# Patient Record
Sex: FEMALE | Race: WHITE | NOT HISPANIC OR LATINO | Employment: OTHER | URBAN - NONMETROPOLITAN AREA
[De-identification: names, ages, dates, MRNs, and addresses within clinical notes are randomized per-mention and may not be internally consistent; named-entity substitution may affect disease eponyms.]

---

## 2022-12-01 ENCOUNTER — OFFICE VISIT (OUTPATIENT)
Dept: URGENT CARE | Facility: CLINIC | Age: 77
End: 2022-12-01

## 2022-12-01 VITALS
HEIGHT: 66 IN | SYSTOLIC BLOOD PRESSURE: 173 MMHG | WEIGHT: 182 LBS | TEMPERATURE: 98.6 F | OXYGEN SATURATION: 98 % | RESPIRATION RATE: 16 BRPM | HEART RATE: 73 BPM | DIASTOLIC BLOOD PRESSURE: 97 MMHG | BODY MASS INDEX: 29.25 KG/M2

## 2022-12-01 DIAGNOSIS — R68.89 FLU-LIKE SYMPTOMS: Primary | ICD-10-CM

## 2022-12-01 RX ORDER — FAMOTIDINE 40 MG/1
40 TABLET, FILM COATED ORAL DAILY PRN
COMMUNITY
Start: 2022-08-30

## 2022-12-01 RX ORDER — ESOMEPRAZOLE MAGNESIUM 40 MG/1
CAPSULE, DELAYED RELEASE ORAL
COMMUNITY
Start: 2022-11-09

## 2022-12-01 RX ORDER — DIGOXIN 125 MCG
TABLET ORAL
COMMUNITY
Start: 2022-11-10

## 2022-12-01 RX ORDER — EZETIMIBE 10 MG/1
TABLET ORAL
COMMUNITY
Start: 2022-10-25

## 2022-12-01 RX ORDER — LEVOTHYROXINE SODIUM 0.12 MG/1
125 TABLET ORAL DAILY
COMMUNITY

## 2022-12-01 RX ORDER — SOTALOL HYDROCHLORIDE 80 MG/1
TABLET ORAL
COMMUNITY
Start: 2022-11-10

## 2022-12-01 NOTE — PROGRESS NOTES
[facility]        NAME: Tino Parra is a 78 y o  female  : 1945    MRN: [id number]  DATE: 2022  TIME: 8:52 AM    Assessment and Plan   Flu-like symptoms [R68 89]  1  Flu-like symptoms  Cov/Flu-Collected at [facility]        COVID/Flu PCR pending  Follow CDC guidelines for isolation/quarantine until results back and then as appropriate based on final result  Encouraged continued supportive measures, including OTC Tylenol, Mucinex, or Coricidin HBP  Increase fluid intake and rest   Follow up with PCP in 3-5 days or present to emergency department for worsening symptoms  Patient verbalized understanding of instructions given  Patient Instructions     Patient Instructions     COVID/Flu PCR pending  Follow CDC guidelines for isolation/quarantine until results back and then as appropriate  Continue with supportive measures, OTC Tylenol, nasal decongestants/cough suppressants (Mucinex)  Cool mist humidifiers, throat lozenges, increased fluid intake and rest   Follow up with PCP in 3-5 days  Present to ER if symptoms worsen  Viral Syndrome   AMBULATORY CARE:   Viral syndrome  is a term used for symptoms of an infection caused by a virus  Viruses are spread easily from person to person through the air and on shared items  Signs and symptoms  may start slowly or suddenly and last hours to days  They can be mild to severe and can change over days or hours  You may have any of the following:  · Fever and chills    · A runny or stuffy nose    · Cough, sore throat, or hoarseness    · Headache, or pain and pressure around your eyes    · Muscle aches and joint pain    · Shortness of breath or wheezing    · Abdominal pain, cramps, and diarrhea    · Nausea, vomiting, or loss of appetite    Call your local emergency number (911 in the [address] [place] [address] Rd,[address]) or have someone else call if:   · You have a seizure  · You cannot be woken  · You have chest pain or trouble breathing      Seek care immediately if:   · You have a stiff neck, a bad headache, and sensitivity to light  · You feel weak, dizzy, or confused  · You stop urinating or urinate a lot less than usual     · You cough up blood or thick yellow or green mucus  · You have severe abdominal pain or your abdomen is larger than usual     Call your doctor if:   · Your symptoms do not get better with treatment or get worse after 3 days  · You have a rash or ear pain  · You have burning when you urinate  · You have questions or concerns about your condition or care  Treatment for viral syndrome  may include medicines to manage your symptoms  Antibiotics are not given for a viral infection  You may  need any of the following:  · Acetaminophen  decreases pain and fever  It is available without a doctor's order  Ask how much to take and how often to take it  Follow directions  Read the labels of all other medicines you are using to see if they also contain acetaminophen, or ask your doctor or pharmacist  Acetaminophen can cause liver damage if not taken correctly  Do not use more than 4 grams (4,000 milligrams) total of acetaminophen in one day  · NSAIDs , such as ibuprofen, help decrease swelling, pain, and fever  NSAIDs can cause stomach bleeding or kidney problems in certain people  If you take blood thinner medicine, always ask your healthcare provider if NSAIDs are safe for you  Always read the medicine label and follow directions  · Cold medicine  helps decrease swelling, control a cough, and relieve chest or nasal congestion  · Saline nasal spray  helps decrease nasal congestion  Manage your symptoms:   · Drink liquids as directed to prevent dehydration  Ask how much liquid to drink each day and which liquids are best for you  Ask if you should drink an oral rehydration solution (ORS)  An ORS has the right amounts of water, salts, and sugar you need to replace body fluids   This may help prevent dehydration caused by vomiting or diarrhea  Do not drink liquids with caffeine  Liquids with caffeine can make dehydration worse  · Get plenty of rest to help your body heal   Take naps throughout the day  Ask your healthcare provider when you can return to work and your normal activities  · Use a cool mist humidifier to help you breathe easier  Ask your healthcare provider how to use a cool mist humidifier  · Eat honey or use cough drops for a sore throat  Cough drops are available without a doctor's order  Follow directions for taking cough drops  · Do not smoke or be close to anyone who is smoking  Nicotine and other chemicals in cigarettes and cigars can cause lung damage  Smoking can also delay healing  Ask your healthcare provider for information if you currently smoke and need help to quit  E-cigarettes or smokeless tobacco still contain nicotine  Talk to your healthcare provider before you use these products  Prevent the spread of germs:       1  Wash your hands often  Wash your hands several times each day  Wash after you use the bathroom, change a child's diaper, and before you prepare or eat food  Use soap and water every time  Rub your soapy hands together, lacing your fingers  Wash the front and back of your hands, and in between your fingers  Use the fingers of one hand to scrub under the fingernails of the other hand  Wash for at least 20 seconds  Rinse with warm, running water for several seconds  Then dry your hands with a clean towel or paper towel  Use hand  that contains alcohol if soap and water are not available  Do not touch your eyes, nose, or mouth without washing your hands first          2  Cover a sneeze or cough  Use a tissue that covers your mouth and nose  Throw the tissue away in a trash can right away  Use the bend of your arm if a tissue is not available  Wash your hands well with soap and water or use a hand   3  Stay away from others while you are sick  Avoid crowds as much as possible  4  Ask about vaccines you may need  Talk to your healthcare provider about your vaccine history  He or she will tell you which vaccines you need, and when to get them  ? Get the influenza (flu) vaccine as soon as recommended each year  The flu vaccine is available starting in September or October  Flu viruses change, so it is important to get a flu vaccine every year  ? Get the pneumonia vaccine if recommended  This vaccine is usually recommended every 5 years  Your provider will tell you when to get this vaccine, if needed  Follow up with your doctor as directed:  Write down your questions so you remember to ask them during your visits  © Copyright [employer] 2022 Information is for End User's use only and may not be sold, redistributed or otherwise used for commercial purposes  All illustrations and images included in CareNotes® are the copyrighted property of [employer] M [employer]  or [employer]  The above information is an  only  It is not intended as medical advice for individual conditions or treatments  Talk to your doctor, nurse or pharmacist before following any medical regimen to see if it is safe and effective for you  Chief Complaint     Chief Complaint   Patient presents with   • Cold Like Symptoms     C/o cough, body aches, and chills since yesterday; took a covid test that came back negative last night         History of Present Illness       72-year-old female with a past medical history significant for Afib and HTN presents with complaints of mildly productive cough, minimal nasal congestion, body aches, and chills x2 days  Patient states symptoms started yesterday and she had a negative at-home COVID test   Reports positive sick contacts as her  is currently ill with similar symptoms  She is vaccinated for COVID as well as Influenza  She has only been taking OTC Tylenol for her symptoms         Review of Systems Review of Systems   Constitutional: Positive for chills  Negative for fever  HENT: Positive for congestion  Negative for ear discharge, ear pain, sore throat and trouble swallowing  Eyes: Negative for discharge  Respiratory: Positive for cough  Negative for shortness of breath and wheezing  Cardiovascular: Negative for chest pain  Gastrointestinal: Negative for abdominal pain, diarrhea, nausea and vomiting  Musculoskeletal: Positive for myalgias  Skin: Negative for rash           Current Medications       Current Outpatient Medications:   •  digoxin (LANOXIN) 0 125 mg tablet, , Disp: , Rfl:   •  esomeprazole (NexIUM) 40 MG capsule, TAKE 1 CAPSULE BY MOUTH ONCE DAILY 30 MINUTES BEFORE LUNCH, Disp: , Rfl:   •  ezetimibe (ZETIA) 10 mg tablet, , Disp: , Rfl:   •  famotidine (PEPCID) 40 MG tablet, Take 40 mg by mouth daily as needed, Disp: , Rfl:   •  levothyroxine 125 mcg tablet, Take 125 mcg by mouth daily, Disp: , Rfl:   •  rivaroxaban (XARELTO) 20 mg tablet, Take 20 mg by mouth, Disp: , Rfl:   •  sotalol (BETAPACE) 80 mg tablet, , Disp: , Rfl:     Current Allergies     Allergies as of 12/01/2022 - Reviewed 12/01/2022   Allergen Reaction Noted   • Albumen, egg - food allergy Anaphylaxis 07/01/2019   • Cefuroxime Anaphylaxis 04/10/2021   • Treenut [nuts - food allergy] Anaphylaxis 07/01/2019   • Celecoxib Nausea Only 11/20/2019   • Keflex [cephalexin] Other (See Comments) 12/01/2022   • Sulfa antibiotics Throat Swelling 04/10/2021   • Penicillins Rash 07/01/2019            The following portions of the patient's history were reviewed and updated as appropriate: allergies, current medications, past family history, past medical history, past social history, past surgical history and problem list      Past Medical History:   Diagnosis Date   • Atrial fibrillation (HCC)    • Hyperlipidemia    • Hypertension    • Lymphedema    • Peripheral neuropathy        Past Surgical History:   Procedure Laterality Date • BACK SURGERY     • CARDIAC SURGERY     • CHOLECYSTECTOMY     • HERNIA REPAIR     • REPLACEMENT TOTAL KNEE BILATERAL     • TOTAL HIP ARTHROPLASTY         Family History   Problem Relation Age of Onset   • No Known Problems Mother    • Asthma Father    • Heart disease Father          Medications have been verified  Objective   BP (!) 173/97   Pulse 73   Temp 98 6 °F (37 °C)   Resp 16   Ht 5' 6" (1 676 m)   Wt 82 6 kg (182 lb)   SpO2 98%   BMI 29 38 kg/m²   No LMP recorded  Patient has had a hysterectomy  Physical Exam     Physical Exam  Vitals and nursing note reviewed  Constitutional:       General: She is not in acute distress  Appearance: She is not toxic-appearing  HENT:      Head: Normocephalic  Right Ear: Tympanic membrane, ear canal and external ear normal       Left Ear: Tympanic membrane, ear canal and external ear normal       Nose: Nose normal       Mouth/Throat:      Mouth: Mucous membranes are moist       Pharynx: Oropharynx is clear  No posterior oropharyngeal erythema  Eyes:      Conjunctiva/sclera: Conjunctivae normal    Cardiovascular:      Rate and Rhythm: Normal rate and regular rhythm  Heart sounds: Normal heart sounds  Pulmonary:      Effort: Pulmonary effort is normal  No respiratory distress  Breath sounds: Normal breath sounds  No stridor  No wheezing, rhonchi or rales  Lymphadenopathy:      Cervical: No cervical adenopathy  Skin:     General: Skin is warm and dry  Neurological:      Mental Status: She is alert and oriented to person, place, and time  Gait: Gait is intact        Comments: Uses cane at baseline   Psychiatric:         Mood and Affect: Mood normal          Behavior: Behavior normal

## 2022-12-01 NOTE — PATIENT INSTRUCTIONS
COVID/Flu PCR pending  Follow CDC guidelines for isolation/quarantine until results back and then as appropriate  Continue with supportive measures, OTC Tylenol, nasal decongestants/cough suppressants (Mucinex)  Cool mist humidifiers, throat lozenges, increased fluid intake and rest   Follow up with PCP in 3-5 days  Present to ER if symptoms worsen  Viral Syndrome   AMBULATORY CARE:   Viral syndrome  is a term used for symptoms of an infection caused by a virus  Viruses are spread easily from person to person through the air and on shared items  Signs and symptoms  may start slowly or suddenly and last hours to days  They can be mild to severe and can change over days or hours  You may have any of the following:  Fever and chills    A runny or stuffy nose    Cough, sore throat, or hoarseness    Headache, or pain and pressure around your eyes    Muscle aches and joint pain    Shortness of breath or wheezing    Abdominal pain, cramps, and diarrhea    Nausea, vomiting, or loss of appetite    Call your local emergency number (911 in the [address] [place] [address],[address]) or have someone else call if:   You have a seizure  You cannot be woken  You have chest pain or trouble breathing  Seek care immediately if:   You have a stiff neck, a bad headache, and sensitivity to light  You feel weak, dizzy, or confused  You stop urinating or urinate a lot less than usual     You cough up blood or thick yellow or green mucus  You have severe abdominal pain or your abdomen is larger than usual     Call your doctor if:   Your symptoms do not get better with treatment or get worse after 3 days  You have a rash or ear pain  You have burning when you urinate  You have questions or concerns about your condition or care  Treatment for viral syndrome  may include medicines to manage your symptoms  Antibiotics are not given for a viral infection  You may  need any of the following:  Acetaminophen  decreases pain and fever   It is available without a doctor's order  Ask how much to take and how often to take it  Follow directions  Read the labels of all other medicines you are using to see if they also contain acetaminophen, or ask your doctor or pharmacist  Acetaminophen can cause liver damage if not taken correctly  Do not use more than 4 grams (4,000 milligrams) total of acetaminophen in one day  NSAIDs , such as ibuprofen, help decrease swelling, pain, and fever  NSAIDs can cause stomach bleeding or kidney problems in certain people  If you take blood thinner medicine, always ask your healthcare provider if NSAIDs are safe for you  Always read the medicine label and follow directions  Cold medicine  helps decrease swelling, control a cough, and relieve chest or nasal congestion  Saline nasal spray  helps decrease nasal congestion  Manage your symptoms:   Drink liquids as directed to prevent dehydration  Ask how much liquid to drink each day and which liquids are best for you  Ask if you should drink an oral rehydration solution (ORS)  An ORS has the right amounts of water, salts, and sugar you need to replace body fluids  This may help prevent dehydration caused by vomiting or diarrhea  Do not drink liquids with caffeine  Liquids with caffeine can make dehydration worse  Get plenty of rest to help your body heal   Take naps throughout the day  Ask your healthcare provider when you can return to work and your normal activities  Use a cool mist humidifier to help you breathe easier  Ask your healthcare provider how to use a cool mist humidifier  Eat honey or use cough drops for a sore throat  Cough drops are available without a doctor's order  Follow directions for taking cough drops  Do not smoke or be close to anyone who is smoking  Nicotine and other chemicals in cigarettes and cigars can cause lung damage  Smoking can also delay healing   Ask your healthcare provider for information if you currently smoke and need help to quit  E-cigarettes or smokeless tobacco still contain nicotine  Talk to your healthcare provider before you use these products  Prevent the spread of germs:       Wash your hands often  Wash your hands several times each day  Wash after you use the bathroom, change a child's diaper, and before you prepare or eat food  Use soap and water every time  Rub your soapy hands together, lacing your fingers  Wash the front and back of your hands, and in between your fingers  Use the fingers of one hand to scrub under the fingernails of the other hand  Wash for at least 20 seconds  Rinse with warm, running water for several seconds  Then dry your hands with a clean towel or paper towel  Use hand  that contains alcohol if soap and water are not available  Do not touch your eyes, nose, or mouth without washing your hands first          Cover a sneeze or cough  Use a tissue that covers your mouth and nose  Throw the tissue away in a trash can right away  Use the bend of your arm if a tissue is not available  Wash your hands well with soap and water or use a hand   Stay away from others while you are sick  Avoid crowds as much as possible  Ask about vaccines you may need  Talk to your healthcare provider about your vaccine history  He or she will tell you which vaccines you need, and when to get them  Get the influenza (flu) vaccine as soon as recommended each year  The flu vaccine is available starting in September or October  Flu viruses change, so it is important to get a flu vaccine every year  Get the pneumonia vaccine if recommended  This vaccine is usually recommended every 5 years  Your provider will tell you when to get this vaccine, if needed  Follow up with your doctor as directed:  Write down your questions so you remember to ask them during your visits    © Copyright [employer] 2022 Information is for End User's use only and may not be sold, redistributed or otherwise used for commercial purposes  All illustrations and images included in CareNotes® are the copyrighted property of [employer]  or [employer]  The above information is an  only  It is not intended as medical advice for individual conditions or treatments  Talk to your doctor, nurse or pharmacist before following any medical regimen to see if it is safe and effective for you

## 2022-12-02 LAB
FLUAV RNA RESP QL NAA+PROBE: NEGATIVE
FLUBV RNA RESP QL NAA+PROBE: NEGATIVE
SARS-COV-2 RNA RESP QL NAA+PROBE: POSITIVE

## 2023-08-20 ENCOUNTER — OFFICE VISIT (OUTPATIENT)
Dept: URGENT CARE | Facility: CLINIC | Age: 78
End: 2023-08-20
Payer: MEDICARE

## 2023-08-20 VITALS
WEIGHT: 187.8 LBS | SYSTOLIC BLOOD PRESSURE: 144 MMHG | TEMPERATURE: 98.7 F | DIASTOLIC BLOOD PRESSURE: 89 MMHG | RESPIRATION RATE: 16 BRPM | OXYGEN SATURATION: 96 % | BODY MASS INDEX: 30.18 KG/M2 | HEIGHT: 66 IN | HEART RATE: 60 BPM

## 2023-08-20 DIAGNOSIS — N30.01 ACUTE CYSTITIS WITH HEMATURIA: Primary | ICD-10-CM

## 2023-08-20 LAB
SL AMB  POCT GLUCOSE, UA: NEGATIVE
SL AMB LEUKOCYTE ESTERASE,UA: ABNORMAL
SL AMB POCT BILIRUBIN,UA: NEGATIVE
SL AMB POCT BLOOD,UA: ABNORMAL
SL AMB POCT CLARITY,UA: CLEAR
SL AMB POCT COLOR,UA: YELLOW
SL AMB POCT KETONES,UA: NEGATIVE
SL AMB POCT NITRITE,UA: NEGATIVE
SL AMB POCT PH,UA: 7
SL AMB POCT SPECIFIC GRAVITY,UA: 1.03
SL AMB POCT URINE PROTEIN: NEGATIVE
SL AMB POCT UROBILINOGEN: NEGATIVE

## 2023-08-20 PROCEDURE — 99213 OFFICE O/P EST LOW 20 MIN: CPT

## 2023-08-20 PROCEDURE — G0463 HOSPITAL OUTPT CLINIC VISIT: HCPCS

## 2023-08-20 PROCEDURE — 81002 URINALYSIS NONAUTO W/O SCOPE: CPT

## 2023-08-20 PROCEDURE — 87086 URINE CULTURE/COLONY COUNT: CPT

## 2023-08-20 RX ORDER — AZELASTINE HYDROCHLORIDE 0.5 MG/ML
SOLUTION/ DROPS OPHTHALMIC
COMMUNITY
Start: 2023-07-31

## 2023-08-20 RX ORDER — PHENAZOPYRIDINE HYDROCHLORIDE 200 MG/1
200 TABLET, FILM COATED ORAL
Qty: 6 TABLET | Refills: 0 | Status: SHIPPED | OUTPATIENT
Start: 2023-08-20

## 2023-08-20 RX ORDER — EPINEPHRINE 0.3 MG/.3ML
INJECTION SUBCUTANEOUS
COMMUNITY
Start: 2023-08-04

## 2023-08-20 RX ORDER — FLUTICASONE PROPIONATE 50 MCG
SPRAY, SUSPENSION (ML) NASAL
COMMUNITY
Start: 2023-07-02

## 2023-08-20 RX ORDER — CYANOCOBALAMIN 1000 UG/ML
INJECTION, SOLUTION INTRAMUSCULAR; SUBCUTANEOUS
COMMUNITY
Start: 2023-06-21

## 2023-08-20 NOTE — PROGRESS NOTES
[facility]        NAME: Gil Woodward is a 70 y.o. female  : 1945    MRN: [id number]  DATE: 2023  TIME: 12:37 PM    Assessment and Plan   Acute cystitis with hematuria [N30.01]  1. Acute cystitis with hematuria  POCT urine dip    Urine culture    phenazopyridine (PYRIDIUM) 200 mg tablet        Urine dip showing moderate leukocytes, small blood, and negative nitrites. Urine culture pending. Patient with significant allergy list. Will treat with Ciprofloxacin as previously successful in treating acute cystitis and patient with active prescription. Will prescribe Pyridium. Encouraged continued supportive measures. Follow up with Urology. Follow up with PCP in 3-5 days or proceed to emergency department for worsening symptoms. Patient verbalized understanding of instructions given. Patient Instructions     Patient Instructions     Urine culture pending  Take antibiotic as previously prescribed for UTI by Urology  Take medication as needed but not for more than 2 days with antibiotics  Increased fluid intake  Follow up with Urology   Follow up with PCP in 3-5 days. Proceed to  ER if symptoms worsen. Urinary Tract Infection in Older Adults   AMBULATORY CARE:   A urinary tract infection (UTI)  is caused by bacteria that get inside your urinary tract. Your urinary tract includes your kidneys, ureters, bladder, and urethra. A UTI is more common in your lower urinary tract, which includes your bladder and urethra.        Common signs and symptoms include the following:   • Fever and chills    • Pain or burning when you urinate    • Urine that smells bad or looks cloudy, or blood in your urine    • Urinating more often or waking from sleep to urinate    • Sudden, strong need to urinate    • Pain or pressure in your lower abdomen     • Leaking urine    • Confusion or agitation    • Fatigue, shakiness, and weakness    Seek care immediately if:   • You become confused or agitated. • You fall down. • You are urinating very little or not at all. • You are vomiting. • You have a high fever with shaking chills. • You have side or back pain that gets worse. Call your doctor if:   • You have a fever. • You are a woman and you have increased white or yellow discharge from your vagina. • You do not feel better after 2 days of taking antibiotics. • You have questions or concerns about your condition or care. Treatment:  Medicines treat the bacterial infection or decrease pain and burning when you urinate. You may also need medicines to decrease the urge to urinate often. If you have UTIs often (called recurrent UTIs), you may be given antibiotics to take regularly. You will be given directions for when and how to use antibiotics. The goal is to prevent UTIs but not cause antibiotic resistance by using antibiotics too often. Self-care:   • Drink liquids as directed. Liquids can help flush bacteria from your urinary tract. Ask how much liquid to drink each day and which liquids are best for you. You may need to drink more liquids than usual to help flush out the bacteria. Do not drink alcohol, caffeine, and citrus juices. These can irritate your bladder and increase your symptoms. • Apply heat  on your abdomen for 20 to 30 minutes every 2 hours for as many days as directed. Heat helps decrease discomfort and pressure in your bladder. Prevent a UTI:   • Urinate when you feel the urge. Do not hold your urine. Bacteria can grow if urine stays in the bladder too long. It may be helpful to urinate at least every 3 to 4 hours. • Urinate after you have sex. This will help flush away bacteria that can enter your urinary tract during sex. • Wear cotton underwear and clothes that are loose. Tight pants and nylon underwear can trap moisture and cause bacteria to grow. • Drink cranberry juice or take cranberry supplements. These may help prevent UTIs. Your healthcare provider can recommend the right juice or supplement for you. • Women should wipe front to back after urinating or having a bowel movement. This may prevent germs from getting into the urinary tract. Do not douche or use feminine deodorants. These can change the chemical balance in your vagina. You may also be given vaginal estrogen medicine. This medicine helps prevent recurrent UTIs in women who have gone through menopause or are in brooke-menopause. Follow up with your doctor as directed:  Write down your questions so you remember to ask them during your visits. © Copyright Izaiah Lena 2022 Information is for End User's use only and may not be sold, redistributed or otherwise used for commercial purposes. The above information is an  only. It is not intended as medical advice for individual conditions or treatments. Talk to your doctor, nurse or pharmacist before following any medical regimen to see if it is safe and effective for you. Chief Complaint     Chief Complaint   Patient presents with   • Possible UTI     Pain and burning upon urination         History of Present Illness       68-year-old female with a past medical history significant for A-fib on blood thinners, hypertension, and recurrent UTI presents with complaints of dysuria, hematuria, and low back pain x3 days. Denies fever, chills, flank pain, vomiting, or diarrhea. Denies urinary frequency or urgency. Patient reports standing prescription for ciprofloxacin prescribed by urology, recently refilled. She did not take this medication yet for her symptoms. She does see Urology twice yearly. Requesting medication for dysuria. Review of Systems   Review of Systems   Constitutional: Negative for chills and fever. HENT: Negative for congestion, ear discharge, ear pain, rhinorrhea, sore throat, trouble swallowing and voice change. Eyes: Negative for discharge.    Respiratory: Negative for cough and shortness of breath. Cardiovascular: Negative for chest pain. Gastrointestinal: Negative for abdominal pain, diarrhea, nausea and vomiting. Genitourinary: Positive for difficulty urinating, dysuria and hematuria. Negative for flank pain, frequency and urgency. Musculoskeletal: Positive for back pain. Skin: Negative for rash.          Current Medications       Current Outpatient Medications:   •  azelastine (OPTIVAR) 0.05 % ophthalmic solution, INSTILL 1 DROP INTO AFFECTED EYE 2 TIMES PER DAY FOR INFLAMMATION OF EYELID LINING DUE TO ALLERGY, Disp: , Rfl:   •  cyanocobalamin 1,000 mcg/mL, INJECT 1ML INTRAMUSCULARLY ONCE A MONTH, Disp: , Rfl:   •  digoxin (LANOXIN) 0.125 mg tablet, , Disp: , Rfl:   •  EPINEPHrine (EPIPEN) 0.3 mg/0.3 mL SOAJ, ADMINISTER 0.3 MILLILITERS INTRAMUSCULARLY ONE TIME, Disp: , Rfl:   •  esomeprazole (NexIUM) 40 MG capsule, TAKE 1 CAPSULE BY MOUTH ONCE DAILY 30 MINUTES BEFORE LUNCH, Disp: , Rfl:   •  ezetimibe (ZETIA) 10 mg tablet, , Disp: , Rfl:   •  famotidine (PEPCID) 40 MG tablet, Take 40 mg by mouth daily as needed, Disp: , Rfl:   •  fluticasone (FLONASE) 50 mcg/act nasal spray, INHALE 2 SPRAYS IN THE NOSE DAILY DIVIDED INTO 1 SPRAY EACH NOSTRIL, Disp: , Rfl:   •  levothyroxine 125 mcg tablet, Take 125 mcg by mouth daily, Disp: , Rfl:   •  phenazopyridine (PYRIDIUM) 200 mg tablet, Take 1 tablet (200 mg total) by mouth 3 (three) times a day with meals, Disp: 6 tablet, Rfl: 0  •  rivaroxaban (XARELTO) 20 mg tablet, Take 20 mg by mouth, Disp: , Rfl:   •  sotalol (BETAPACE) 80 mg tablet, , Disp: , Rfl:     Current Allergies     Allergies as of 08/20/2023 - Reviewed 08/20/2023   Allergen Reaction Noted   • Albumen, egg - food allergy Anaphylaxis 07/01/2019   • Amoxicillin Angioedema 08/14/2018   • Cefuroxime Anaphylaxis 04/10/2021   • Treenut [nuts - food allergy] Anaphylaxis 07/01/2019   • Celecoxib Nausea Only 11/20/2019   • Keflex [cephalexin] Other (See Comments) 12/01/2022   • Sulfa antibiotics Throat Swelling 04/10/2021   • Penicillins Rash 07/01/2019            The following portions of the patient's history were reviewed and updated as appropriate: allergies, current medications, past family history, past medical history, past social history, past surgical history and problem list.     Past Medical History:   Diagnosis Date   • Atrial fibrillation ([address])    • Disease of thyroid gland    • Hyperlipidemia    • Hypertension    • Lymphedema    • Peripheral neuropathy        Past Surgical History:   Procedure Laterality Date   • ARTHROPLASTY HIP TOTAL ANTERIOR REVISION     • BACK SURGERY     • CHOLECYSTECTOMY     • HERNIA REPAIR     • HYSTERECTOMY     • REPLACEMENT TOTAL KNEE BILATERAL     • TOTAL HIP ARTHROPLASTY         Family History   Problem Relation Age of Onset   • Heart disease Mother    • COPD Father    • Heart disease Father          Medications have been verified. Objective   /89   Pulse 60   Temp 98.7 °F (37.1 °C)   Resp 16   Ht 5' 6" (1.676 m)   Wt 85.2 kg (187 lb 12.8 oz)   SpO2 96%   BMI 30.31 kg/m²   No LMP recorded. Patient has had a hysterectomy. Physical Exam     Physical Exam  Vitals and nursing note reviewed. Constitutional:       General: She is not in acute distress. Appearance: She is not toxic-appearing. HENT:      Head: Normocephalic. Nose: Nose normal.      Mouth/Throat:      Mouth: Mucous membranes are moist.      Pharynx: Oropharynx is clear. Eyes:      Conjunctiva/sclera: Conjunctivae normal.   Cardiovascular:      Rate and Rhythm: Normal rate and regular rhythm. Heart sounds: Normal heart sounds. Pulmonary:      Effort: Pulmonary effort is normal. No respiratory distress. Breath sounds: Normal breath sounds. No stridor. No wheezing, rhonchi or rales. Abdominal:      General: Bowel sounds are normal.      Palpations: Abdomen is soft. Tenderness: There is no abdominal tenderness.  There is no right CVA tenderness or left CVA tenderness. Skin:     General: Skin is warm and dry. Neurological:      Mental Status: She is alert and oriented to person, place, and time. Gait: Gait is intact.    Psychiatric:         Mood and Affect: Mood normal.         Behavior: Behavior normal.

## 2023-08-20 NOTE — PATIENT INSTRUCTIONS
Urine culture pending  Take antibiotic as previously prescribed for UTI by Urology  Take medication as needed but not for more than 2 days with antibiotics  Increased fluid intake  Follow up with Urology   Follow up with PCP in 3-5 days. Proceed to  ER if symptoms worsen. Urinary Tract Infection in Older Adults   AMBULATORY CARE:   A urinary tract infection (UTI)  is caused by bacteria that get inside your urinary tract. Your urinary tract includes your kidneys, ureters, bladder, and urethra. A UTI is more common in your lower urinary tract, which includes your bladder and urethra. Common signs and symptoms include the following:   Fever and chills    Pain or burning when you urinate    Urine that smells bad or looks cloudy, or blood in your urine    Urinating more often or waking from sleep to urinate    Sudden, strong need to urinate    Pain or pressure in your lower abdomen     Leaking urine    Confusion or agitation    Fatigue, shakiness, and weakness    Seek care immediately if:   You become confused or agitated. You fall down. You are urinating very little or not at all. You are vomiting. You have a high fever with shaking chills. You have side or back pain that gets worse. Call your doctor if:   You have a fever. You are a woman and you have increased white or yellow discharge from your vagina. You do not feel better after 2 days of taking antibiotics. You have questions or concerns about your condition or care. Treatment:  Medicines treat the bacterial infection or decrease pain and burning when you urinate. You may also need medicines to decrease the urge to urinate often. If you have UTIs often (called recurrent UTIs), you may be given antibiotics to take regularly. You will be given directions for when and how to use antibiotics. The goal is to prevent UTIs but not cause antibiotic resistance by using antibiotics too often. Self-care:   Drink liquids as directed. Liquids can help flush bacteria from your urinary tract. Ask how much liquid to drink each day and which liquids are best for you. You may need to drink more liquids than usual to help flush out the bacteria. Do not drink alcohol, caffeine, and citrus juices. These can irritate your bladder and increase your symptoms. Apply heat  on your abdomen for 20 to 30 minutes every 2 hours for as many days as directed. Heat helps decrease discomfort and pressure in your bladder. Prevent a UTI:   Urinate when you feel the urge. Do not hold your urine. Bacteria can grow if urine stays in the bladder too long. It may be helpful to urinate at least every 3 to 4 hours. Urinate after you have sex. This will help flush away bacteria that can enter your urinary tract during sex. Wear cotton underwear and clothes that are loose. Tight pants and nylon underwear can trap moisture and cause bacteria to grow. Drink cranberry juice or take cranberry supplements. These may help prevent UTIs. Your healthcare provider can recommend the right juice or supplement for you. Women should wipe front to back after urinating or having a bowel movement. This may prevent germs from getting into the urinary tract. Do not douche or use feminine deodorants. These can change the chemical balance in your vagina. You may also be given vaginal estrogen medicine. This medicine helps prevent recurrent UTIs in women who have gone through menopause or are in brooke-menopause. Follow up with your doctor as directed:  Write down your questions so you remember to ask them during your visits. © Copyright Aga Knapp 2022 Information is for End User's use only and may not be sold, redistributed or otherwise used for commercial purposes. The above information is an  only. It is not intended as medical advice for individual conditions or treatments.  Talk to your doctor, nurse or pharmacist before following any medical regimen to see if it is safe and effective for you.

## 2023-08-21 LAB — BACTERIA UR CULT: NORMAL

## 2024-01-22 ENCOUNTER — EVALUATION (OUTPATIENT)
Dept: PHYSICAL THERAPY | Facility: CLINIC | Age: 79
End: 2024-01-22
Payer: MEDICARE

## 2024-01-22 VITALS — HEART RATE: 67 BPM | SYSTOLIC BLOOD PRESSURE: 134 MMHG | OXYGEN SATURATION: 98 % | DIASTOLIC BLOOD PRESSURE: 84 MMHG

## 2024-01-22 DIAGNOSIS — I89.0 LYMPHEDEMA OF BOTH LOWER EXTREMITIES: Primary | ICD-10-CM

## 2024-01-22 PROCEDURE — 97110 THERAPEUTIC EXERCISES: CPT | Performed by: PHYSICAL THERAPIST

## 2024-01-22 PROCEDURE — 97162 PT EVAL MOD COMPLEX 30 MIN: CPT | Performed by: PHYSICAL THERAPIST

## 2024-01-22 PROCEDURE — 97530 THERAPEUTIC ACTIVITIES: CPT | Performed by: PHYSICAL THERAPIST

## 2024-01-22 NOTE — PROGRESS NOTES
PT Evaluation     Today's date: 2024  Patient name: Ann Marie Liu  : 1945  MRN: [id number]  Referring provider: Soledad Avila MD  Dx:   Encounter Diagnosis     ICD-10-CM    1. Lymphedema of both lower extremities  I89.0                      Lymphedema Physical Assessment        SUBJECTIVE EVALUATION:  History of present illness: Hx of B LE lymphedema reported by pt since  onset following TKR 7  RLE ,  TKR L LE ~ 6 yrs ago. Had CDT  with positive response and wore compression stockings. Underwent R THR    and 2 months later had a revision. re. Dx with neuropathy in RLE with self reported complications  of scar tissue and neuropathy. Notes being dx with Lipedema  while hospitalized by cardiologist in the past. Underwent more care for BLE about 2 years ago with improvements. She has own Ly pumps and reports compliance  with use daily for the past few years.  Notes decrease wearing of compression stockings secondary to pain and difficulty with donning/doffing.  She notes gradual worsening in BLE Ly over the past few months. No known family hx of Ly or Lipedema but did have swelling and open wounds in LE s. Pt goals are to have relief of pain and swelling, walk better.  Impact of neuropathy impacting her gait also.     Pain (0-10):  6/10   Location:  in R LE R outer hip and thigh    Social Support:   Lives in: own home   Lives with: spouse    Occupation:  Employment status: retired  Physical job demands:n/a- home keeping, independent with self care    OBJECTIVE:    Pulse: (0 no, 1+ diminished, 2+ mildly diminished, 3+ normal, 4 bounding)     Dorsal Pedal: 2+ B   Posterior Tibial:    Capillary Refill (=< 3 seconds is normal): difficult secondary to nail discoloration    Palpation: tender diffuse tenderness reported throughout B LE soft tissue legs, thighs    Pitting: none     Stemmer's Sign:  negative B feet    Gait assessment: Pt amb with SLC decrease trunk flexed and slow pace, dec B ankle df- flat  footed gait    Transfer status:  Independent    Sensation: BLE sensation intact except for decrease in ability at R medial great toe    Ability to don/doff clothing/garments: Requires A at time to remove and don stockings and has not been wearing  as  is unable to assist    LE ROM:   Knee flexion limitation: 110-112 B knees  Ankle DF/PF limitation: 0 degrees df, 30 degrees pf B LE s; tie AROM L decreased by 50% of full, R decreased by 75% of full    UE ROM:  Shldr OH flex:  Shldr OH abd:  Shldr ER:  Shldr IR:  Elbow flex/ext:  Wrist flex/ext:    Strength:  UE:  LE:  4/5 at hips, knees and ankles BLEs    Self management:  Home compression pump:yes, daily x past several years  Compression stockings: intermittent use secondary to difficulty don/dof  Lymphedema exercise:none currently; reports completing 60 minutes of cubi pedaling LE ex daily prior to [place]    Skin Assessment: surgical scars present lumbar spine and R lateral hip, B anterior knees  Skin mobility: good throughout thighs and lower legs and feet  Fibrosis (0 normal - 4 >severe): 0   Erythema scale (1 very faint, 2 faint, 3 bright, 4 very bright): 0, no erythema either LE  Hemosiderin staining present: none  Blister present: none either LE   Location:   Color:   Size:  Wound present: none either LE   Location:   Color:   Size:    Visualization:  Bony prominences: none visible either LE  Tendon pathways: none visible either LE  Joint creases: none visible either LE    Lymphedema classification (0 latent, 1 reverse, 2 non-rev, 3 elephantiasis):  1-2 , some characteristics of lipedema - diffuse tenderness to BLEs, denies any hx of bruising reported    PMHx:  Cellulitis: no history   Most recent:  Cardiac: hx of atrial fibrillation and use of xarelto  Renal: none  Hepatic: none   Cancer: thyroid cancer age 21 yo   Type:    Treatment: surgical excision and levothyroxine medication  Diabetes: no  Ortho: see above  Surgical: see above plus gall bladder  removal, lumbar spine  surgery with known spinal stenosis      >> REFER TO FLOW SHEET FOR GIRTH MEASUREMENTS <<  LOWER EXTREMITY LEFT CALCULATIONS      Flowsheet Row Most Recent Value   Volume LE (mL) 64388   Difference from last visit (mL)  -63171   Difference from first visit (mL)  -09506          LOWER EXTREMITY RIGHT CALCULATIONS      Flowsheet Row Most Recent Value   Volume LE (mL) 58569   Difference from last visit (mL)  -73130   Difference from first visit (mL)  -53526          LOWER EXTREMITY CHANGE OF AFFECTED LIMB CALCULATIONS      Flowsheet Row Most Recent Value   Current change in volume of affected limb (mL)  0   Current change in volume of affected limb (%)  12   Change in volume of affected limb since eval (mL)  0   Change in volume of affected limb since eval (%)  0              ASSESSMENT:    Pt presents w/ B LE edema w/ negative  stemmer's, no  pitting, and no  skin fibrosis, predominant involvement of B lower legs distal 2/3rds into foot and toes.  L > R girth measurements are noted which fits visual inspection of L>R LE size. PMHx significant for B TKR, R THR + revision and no hx of cellulitis B LE which could have been an indicator and potential compromise of LE lymphatics.  Advise initiating full CDT protocol w/ MLD and compression bandaging or bandage alternative secondary to difficulty with hands reported followed by fitment of compression stockings once limb size is reduced.       PT reviewed with patient lymphedema education of skin care including: keeping extremity clean and dry, applying moisturizer daily, special attention to nail care, wearing sunscreen avoiding nicks and skin irritation from razors, wearing gloves with activities that may cause skin injury.   Also reviewed for patient to avoid excessive constriction such as tight clothing and jewelry, extreme temperature changes, and the importance of compliance for bandaging and garments.   Patient also advised to contact physician if  experiencing any constitutional symptoms, swelling, redness, pain, rash, itching, or increased skin temperature.    Goals:  STG  - Patient and/or caregiver will understand lymphedema precautions to decrease risk of infection and exacerbation of lymphedema  - Patient will develop a tolerance for wearing multi-layer, short stretch bandages betweens sessions to facilitate limb decongestion  - Patient will experience decrease in pitting edema which will improve tissue health and decrease risk of infection.   - Patient will perform HEP independently or with minimal assistance to help improve lymphatic flow and venous return    LTG  - Patient will experience increased ROM and functional mobility to aid with ADL's at time of discharge  - Patient and/or caregiver will be independent with donning and doffing of compression garments which will enable regular daily garment wear at time of discharge, skin maintenance, and self- MLD   - Patient and/or caregiver will be independent with HEP and lymphedema management to prevent relapse and reduce risk of infection and hospitalizations at time of discharge      Lymphedema Life Impact Scale    Score:   % Impairment:  Infection Occurrence: none         PLAN:  Patient would benefit from: skilled physical therapy  Planned therapy interventions: manual therapy, manual lymph drainage,  compression, home exercise program and patient education, therapeutic activities; pt to bring her compression garments next visit and any donning aids she has to determine fit, ability and state of garment, compression class  Frequency:  2 x per week  Duration in weeks: 4-6 weeks  Plan of Care beginning date: 1/22/24  Plan of Care expiration date: 03/05/2024  Treatment plan discussed with: patient    Precautions: hx of atrial fibrillation, thyroid CA age 21 yo, R THR with revision, B TKR,  LE neuropathy     Daily Treatment Diary:      Initial Evaluation Date: 01/22/24  Compliance 1/22                      Visit Number 1                    Re-Eval  IE                    Foto Captured LLIS                           1/22                     Manual                      Circumferential measures 10m                     Garment measures -                     MLD BLEs -                     Ther-Ex                      AROM Les toes flex/ext//ankle pf, df, circles 10x                     Heel slides 5x                     ASLRS 5x                     LTR, BKFOs                      Diaphragmatic breathing                      Mini squats                                                                                        nustep                      Neuro Re-Ed                                                                                                Ther-Act              gait training with SLC                      Ly manage, reduction, signs, sx, education tx plan, compression ideas, don/doff A PC                          Modalities

## 2024-01-22 NOTE — LETTER
2024    Soledad Avila MD  [address] [facility] [address]    Patient: Ann Marie Liu   YOB: 1945   Date of Visit: 2024     Encounter Diagnosis     ICD-10-CM    1. Lymphedema of both lower extremities  I89.0           Dear Dr. Avila:    Thank you for your recent referral of Ann Marie Liu. Please review the attached evaluation summary from Ann Marie's recent visit.     Please verify that you agree with the plan of care by signing the attached order.     If you have any questions or concerns, please do not hesitate to call.     I sincerely appreciate the opportunity to share in the care of one of your patients and hope to have another opportunity to work with you in the near future.       Sincerely,    Laisha Kwan, PT      Referring Provider:      I certify that I have read the below Plan of Care and certify the need for these services furnished under this plan of treatment while under my care.                    Soledad Avila MD  [address] [place] [id number]  Via Fax: [phone]          PT Evaluation     Today's date: 2024  Patient name: Ann Marie Liu  : 1945  MRN: [id number]  Referring provider: Soledad Avila MD  Dx:   Encounter Diagnosis     ICD-10-CM    1. Lymphedema of both lower extremities  I89.0                      Lymphedema Physical Assessment        SUBJECTIVE EVALUATION:  History of present illness: Hx of B LE lymphedema reported by pt since  onset following TKR 7  RLE ,  TKR L LE ~ 6 yrs ago. Had CDT  with positive response and wore compression stockings. Underwent R THR 2019   and 2 months later had a revision. re. Dx with neuropathy in RLE with self reported complications  of scar tissue and neuropathy. Notes being dx with Lipedema  while hospitalized by cardiologist in the past. Underwent more care for BLE about 2 years ago with improvements. She has own Ly pumps and reports compliance  with use daily for the past few  years.  Notes decrease wearing of compression stockings secondary to pain and difficulty with donning/doffing.  She notes gradual worsening in BLE Ly over the past few months. No known family hx of Ly or Lipedema but did have swelling and open wounds in LE s. Pt goals are to have relief of pain and swelling, walk better.  Impact of neuropathy impacting her gait also.     Pain (0-10):  6/10   Location:  in R LE R outer hip and thigh    Social Support:   Lives in: own home   Lives with: spouse    Occupation:  Employment status: retired  Physical job demands:n/a- home keeping, independent with self care    OBJECTIVE:    Pulse: (0 no, 1+ diminished, 2+ mildly diminished, 3+ normal, 4 bounding)     Dorsal Pedal: 2+ B   Posterior Tibial:    Capillary Refill (=< 3 seconds is normal): difficult secondary to nail discoloration    Palpation: tender diffuse tenderness reported throughout B LE soft tissue legs, thighs    Pitting: none     Stemmer's Sign:  negative B feet    Gait assessment: Pt amb with SLC decrease trunk flexed and slow pace, dec B ankle df- flat footed gait    Transfer status:  Independent    Sensation: BLE sensation intact except for decrease in ability at R medial great toe    Ability to don/doff clothing/garments: Requires A at time to remove and don stockings and has not been wearing  as  is unable to assist    LE ROM:   Knee flexion limitation: 110-112 B knees  Ankle DF/PF limitation: 0 degrees df, 30 degrees pf B LE s; tie AROM L decreased by 50% of full, R decreased by 75% of full    UE ROM:  Shldr OH flex:  Shldr OH abd:  Shldr ER:  Shldr IR:  Elbow flex/ext:  Wrist flex/ext:    Strength:  UE:  LE:  4/5 at hips, knees and ankles BLEs    Self management:  Home compression pump:yes, daily x past several years  Compression stockings: intermittent use secondary to difficulty don/dof  Lymphedema exercise:none currently; reports completing 60 minutes of cubi pedaling LE ex daily prior to  [place]    Skin Assessment: surgical scars present lumbar spine and R lateral hip, B anterior knees  Skin mobility: good throughout thighs and lower legs and feet  Fibrosis (0 normal - 4 >severe): 0   Erythema scale (1 very faint, 2 faint, 3 bright, 4 very bright): 0, no erythema either LE  Hemosiderin staining present: none  Blister present: none either LE   Location:   Color:   Size:  Wound present: none either LE   Location:   Color:   Size:    Visualization:  Bony prominences: none visible either LE  Tendon pathways: none visible either LE  Joint creases: none visible either LE    Lymphedema classification (0 latent, 1 reverse, 2 non-rev, 3 elephantiasis):  1-2 , some characteristics of lipedema - diffuse tenderness to BLEs, denies any hx of bruising reported    PMHx:  Cellulitis: no history   Most recent:  Cardiac: hx of atrial fibrillation and use of xarelto  Renal: none  Hepatic: none   Cancer: thyroid cancer age 23 yo   Type:    Treatment: surgical excision and levothyroxine medication  Diabetes: no  Ortho: see above  Surgical: see above plus gall bladder removal, lumbar spine  surgery with known spinal stenosis      >> REFER TO FLOW SHEET FOR GIRTH MEASUREMENTS <<  LOWER EXTREMITY LEFT CALCULATIONS      Flowsheet Row Most Recent Value   Volume LE (mL) 87495   Difference from last visit (mL)  -96956   Difference from first visit (mL)  -57268          LOWER EXTREMITY RIGHT CALCULATIONS      Flowsheet Row Most Recent Value   Volume LE (mL) 35372   Difference from last visit (mL)  -69586   Difference from first visit (mL)  -60749          LOWER EXTREMITY CHANGE OF AFFECTED LIMB CALCULATIONS      Flowsheet Row Most Recent Value   Current change in volume of affected limb (mL)  0   Current change in volume of affected limb (%)  12   Change in volume of affected limb since eval (mL)  0   Change in volume of affected limb since eval (%)  0              ASSESSMENT:    Pt presents w/ B LE edema w/ negative   stemmer's, no  pitting, and no  skin fibrosis, predominant involvement of B lower legs distal 2/3rds into foot and toes.  L > R girth measurements are noted which fits visual inspection of L>R LE size. PMHx significant for B TKR, R THR + revision and no hx of cellulitis B LE which could have been an indicator and potential compromise of LE lymphatics.  Advise initiating full CDT protocol w/ MLD and compression bandaging or bandage alternative secondary to difficulty with hands reported followed by fitment of compression stockings once limb size is reduced.       PT reviewed with patient lymphedema education of skin care including: keeping extremity clean and dry, applying moisturizer daily, special attention to nail care, wearing sunscreen avoiding nicks and skin irritation from razors, wearing gloves with activities that may cause skin injury.   Also reviewed for patient to avoid excessive constriction such as tight clothing and jewelry, extreme temperature changes, and the importance of compliance for bandaging and garments.   Patient also advised to contact physician if experiencing any constitutional symptoms, swelling, redness, pain, rash, itching, or increased skin temperature.    Goals:  STG  - Patient and/or caregiver will understand lymphedema precautions to decrease risk of infection and exacerbation of lymphedema  - Patient will develop a tolerance for wearing multi-layer, short stretch bandages betweens sessions to facilitate limb decongestion  - Patient will experience decrease in pitting edema which will improve tissue health and decrease risk of infection.   - Patient will perform HEP independently or with minimal assistance to help improve lymphatic flow and venous return    LTG  - Patient will experience increased ROM and functional mobility to aid with ADL's at time of discharge  - Patient and/or caregiver will be independent with donning and doffing of compression garments which will enable  regular daily garment wear at time of discharge, skin maintenance, and self- MLD   - Patient and/or caregiver will be independent with HEP and lymphedema management to prevent relapse and reduce risk of infection and hospitalizations at time of discharge      Lymphedema Life Impact Scale    Score:   % Impairment:  Infection Occurrence: none         PLAN:  Patient would benefit from: skilled physical therapy  Planned therapy interventions: manual therapy, manual lymph drainage,  compression, home exercise program and patient education, therapeutic activities; pt to bring her compression garments next visit and any donning aids she has to determine fit, ability and state of garment, compression class  Frequency:  2 x per week  Duration in weeks: 4-6 weeks  Plan of Care beginning date: 1/22/24  Plan of Care expiration date: 03/05/2024  Treatment plan discussed with: patient    Precautions: hx of atrial fibrillation, thyroid CA age 21 yo, R THR with revision, B TKR,  LE neuropathy     Daily Treatment Diary:      Initial Evaluation Date: 01/22/24  Compliance 1/22                     Visit Number 1                    Re-Eval  IE                 MC   Foto Captured LLIS                           1/22                     Manual                      Circumferential measures 10m                     Garment measures -                     MLD BLEs -                     Ther-Ex                      AROM Les toes flex/ext//ankle pf, df, circles 10x                     Heel slides 5x                     ASLRS 5x                     LTR, BKFOs                      Diaphragmatic breathing                      Mini squats                                                                                        nustep                      Neuro Re-Ed                                                                                                Ther-Act              gait training with SLC                      Ly manage, reduction, signs,  sx, education tx plan, compression ideas, don/doff A PC                          Modalities

## 2024-01-24 ENCOUNTER — OFFICE VISIT (OUTPATIENT)
Dept: PHYSICAL THERAPY | Facility: CLINIC | Age: 79
End: 2024-01-24
Payer: MEDICARE

## 2024-01-24 VITALS — SYSTOLIC BLOOD PRESSURE: 124 MMHG | DIASTOLIC BLOOD PRESSURE: 78 MMHG

## 2024-01-24 DIAGNOSIS — I89.0 LYMPHEDEMA OF BOTH LOWER EXTREMITIES: Primary | ICD-10-CM

## 2024-01-24 PROCEDURE — 97140 MANUAL THERAPY 1/> REGIONS: CPT | Performed by: PHYSICAL THERAPIST

## 2024-01-24 PROCEDURE — 97110 THERAPEUTIC EXERCISES: CPT | Performed by: PHYSICAL THERAPIST

## 2024-01-24 NOTE — PROGRESS NOTES
Daily Note     Today's date: 2024  Patient name: Ann Marie Liu  : 1945  MRN: [id number]  Referring provider: Soledad Avila MD  Dx:   Encounter Diagnosis     ICD-10-CM    1. Lymphedema of both lower extremities  I89.0                      Subjective: Pt notes she is doing well today and continue to use pumps daily I did begin cubi again.       Objective: See treatment diary below      Assessment: Reviewed comporession stockings that pt has acquired in past with her and she stopped wearing secondary to difficulty with doffing and not having assistt of . Tolerated treatment well. With no adverse reactions. Discussed garment options with pt today and she wishes to trial don/doff of knee highs for a few days  to see if she can independently complete. Pt adverse to capris secondary to B hand OA,thumb issues and is planning to seek care by orthopedist for this and may possibly open more options for garments.  Pt  Patient would benefit from continued PT and would benefit from outlined care to achieve goals.       Plan: Continue per plan of care.      Precautions: hx of atrial fibrillation, thyroid CA age 21 yo, R THR with revision, B TKR,  LE neuropathy     Daily Treatment Diary:      Initial Evaluation Date: 24  Compliance                    Visit Number 1 2                   Re-Eval  IE                 MC   Foto Captured LLIS                                              Manual                      Circumferential measures 10m                     Garment measures -                     MLD BLEs -  45m                   Ther-Ex                      AROM Les toes flex/ext//ankle pf, df, circles 10x  10x                   Heel slides 5x                     ASLRS 5x                     LTR, BKFOs                      Diaphragmatic breathing                      Mini squats                                                                                        nustep                       Neuro Re-Ed                                                                                                Ther-Act              gait training with SLC                      Ly manage, reduction, signs, sx, education tx plan, compression ideas, don/doff A PC PC HEP garment education                         Modalities

## 2024-01-29 ENCOUNTER — OFFICE VISIT (OUTPATIENT)
Dept: PHYSICAL THERAPY | Facility: CLINIC | Age: 79
End: 2024-01-29
Payer: MEDICARE

## 2024-01-29 DIAGNOSIS — I89.0 LYMPHEDEMA OF BOTH LOWER EXTREMITIES: Primary | ICD-10-CM

## 2024-01-29 PROCEDURE — 97530 THERAPEUTIC ACTIVITIES: CPT | Performed by: PHYSICAL THERAPIST

## 2024-01-29 PROCEDURE — 97140 MANUAL THERAPY 1/> REGIONS: CPT | Performed by: PHYSICAL THERAPIST

## 2024-01-29 NOTE — PROGRESS NOTES
Daily Note     Today's date: 2024  Patient name: Ann Marie Liu  : 1945  MRN: [id number]  Referring provider: Soledad Avila MD  Dx:   Encounter Diagnosis     ICD-10-CM    1. Lymphedema of both lower extremities  I89.0                      Subjective: Pt states her skin is much more soft and she is using Eucerine at least 2 x per day. She notes less pain in  B thighs to touch. She expresses concern about donning and doffing compression garments as he  is no longer able to help her. She thinks it was 6-12 months since she was able to put on the  mmHg thigh highs and her  was able to help her at that time.       Objective: See treatment diary below      Assessment: Tolerated treatment well. With no adverse reactions to increase in duration of MLD. Pt skin hydration on Les and feet much improving. Pt. Amb with improved gait following care today cont with use of SLC at all times.   Patient would benefit from continued PT to achivee goals of care.  Discussed with pt the use of don/doff aids for garments and discussed L1 compression class to ease application and removal. Also discussed compression alternative wraps with velcro and had pt trial open/close of velcro x 3 noting no difficulty today but notes her hands feel better today than most days.     Plan: Continue per plan of care.      Precautions: hx of atrial fibrillation, thyroid CA age 23 yo, R THR with revision, B TKR,  LE neuropathy     Daily Treatment Diary:      Initial Evaluation Date: 24  Compliance                  Visit Number 1 2  3                 Re-Eval  IE                 [facility] IMPAIR[facility]                                            Manual                      Circumferential measures 10m                     Garment measures -                     MLD BLEs -  45m  60M                 Ther-Ex                      AROM Les toes flex/ext//ankle pf, df, circles 10x   10x                   Heel slides 5x                     ASLRS 5x                     LTR, BKFOs                      Diaphragmatic breathing                      Mini squats                                                                                        nustep                      Neuro Re-Ed                                                                                                Ther-Act              gait training with SLC                      Ly manage, reduction, signs, sx, education tx plan, compression ideas, don/doff A PC PC HEP garment education Pc GARMENT EDUC/TRIAL OF CLOSURE                        Modalities

## 2024-01-31 ENCOUNTER — OFFICE VISIT (OUTPATIENT)
Dept: PHYSICAL THERAPY | Facility: CLINIC | Age: 79
End: 2024-01-31
Payer: MEDICARE

## 2024-01-31 DIAGNOSIS — I89.0 LYMPHEDEMA OF BOTH LOWER EXTREMITIES: Primary | ICD-10-CM

## 2024-01-31 PROCEDURE — 97140 MANUAL THERAPY 1/> REGIONS: CPT | Performed by: PHYSICAL THERAPIST

## 2024-01-31 NOTE — PROGRESS NOTES
Daily Note     Today's date: 2024  Patient name: Ann Marie Liu  : 1945  MRN: [id number]  Referring provider: Soledad Avila MD  Dx:   Encounter Diagnosis     ICD-10-CM    1. Lymphedema of both lower extremities  I89.0                      Subjective: Pt  notes she has some joint pain in knees and hips but overall is walking better as legs are less sore since undergoing care here.      Objective: See treatment diary below      Assessment:  Pt cont to have improved skin hydration of legs and feet with regular use of lotion. Skin is becoming softer and more mobile with tx.  In B lower legs-ankles. Pt. Has appointment Friday for B thumb pain and wishes to decide about compression garments at after finding out more about thumb pain, dx and tx plan regarding donning and doffing garments. Pt tolerated treatment well. Patient would benefit from continued PT to achieve goals.       Plan: Continue per plan of care.      Precautions: hx of atrial fibrillation, thyroid CA age 23 yo, R THR with revision, B TKR,  LE neuropathy     Daily Treatment Diary:      Initial Evaluation Date: 24  Compliance                Visit Number 1 2  3  4               Re-Eval  IE                 [facility] [place] LLIS= 44% IMPAIRMENT                                          Manual                      Circumferential measures 10m                     Garment measures -                     MLD BLEs -  45m  60M  60m               Ther-Ex                      AROM Les toes flex/ext//ankle pf, df, circles 10x  10x                   Heel slides 5x                     ASLRS 5x                     LTR, BKFOs                      Diaphragmatic breathing                      Mini squats                                                                                        nustep                      Neuro Re-Ed                                                                                                 Ther-Act              gait training with SLC                      Ly manage, reduction, signs, sx, education tx plan, compression ideas, don/doff A PC PC HEP garment education Pc GARMENT EDUC/TRIAL OF CLOSURE                        Modalities

## 2024-02-05 ENCOUNTER — APPOINTMENT (OUTPATIENT)
Dept: PHYSICAL THERAPY | Facility: CLINIC | Age: 79
End: 2024-02-05
Payer: MEDICARE

## 2024-02-07 ENCOUNTER — OFFICE VISIT (OUTPATIENT)
Dept: PHYSICAL THERAPY | Facility: CLINIC | Age: 79
End: 2024-02-07
Payer: MEDICARE

## 2024-02-07 DIAGNOSIS — I89.0 LYMPHEDEMA OF BOTH LOWER EXTREMITIES: Primary | ICD-10-CM

## 2024-02-07 PROCEDURE — 97140 MANUAL THERAPY 1/> REGIONS: CPT | Performed by: PHYSICAL THERAPIST

## 2024-02-07 NOTE — PROGRESS NOTES
"Daily Note     Today's date: 2024  Patient name: Ann Marie Liu  : 1945  MRN: [id number]  Referring provider: Soledad Avila MD  Dx:   Encounter Diagnosis     ICD-10-CM    1. Lymphedema of both lower extremities  I89.0                      Subjective: Pt notes her legs feel \"wonderful\"  with her current tx and feels PT has really helped to decrease some swelling but more importantly the pain and heaviness. She noted tolerating standing for cooking/baking for a few consecutive days with good control of sx. Wearing rissa copper socks. Pt verbalizes wish to acquire compression stockings.Pt notes she received an injection into [facility] of L thumb by Dr. Hawthorne which has helped reduce her pain sx.       Objective: See treatment diary below. Pt. Presents with negative stemmers sign B feet. She has ggod soft tissue mobility in  B LE s and feet today. PT tolerated MLD well. Will proceed with compression stocking order request from Dr. Avila.      Assessment: Tolerated treatment well. Patient would benefit from continued PT to achieve goals of care. Pt. Reports she has achieved her goals of reduction of Pain and improved mobility at this time, but does wish to acquire stockings      Plan: continue with care plan and order of stockings as per Dr. Avila.      Precautions: hx of atrial fibrillation, thyroid CA age 23 yo, R THR with revision, B TKR,  LE neuropathy     Daily Treatment Diary:      Initial Evaluation Date: 24  Compliance              Visit Number 1 2  3  4  5             Re-Eval  IE                 MC   Foto Captured LLIS= 44% IMPAIRMENT                                        Manual                      Circumferential measures 10m                     Garment measures -                     MLD BLEs -  45m  60M  60m  60m             Ther-Ex                      AROM Les toes flex/ext//ankle pf, df, circles 10x  10x                   Heel slides " 5x                     ASLRS 5x                     LTR, BKFOs                      Diaphragmatic breathing                      Mini squats                                                                                        nustep                      Neuro Re-Ed                                                                                                Ther-Act              gait training with SLC                      Ly manage, reduction, signs, sx, education tx plan, compression ideas, don/doff A PC PC HEP garment education Pc GARMENT EDUC/TRIAL OF CLOSURE                        Modalities